# Patient Record
Sex: FEMALE | Race: WHITE | NOT HISPANIC OR LATINO | Employment: UNEMPLOYED | URBAN - METROPOLITAN AREA
[De-identification: names, ages, dates, MRNs, and addresses within clinical notes are randomized per-mention and may not be internally consistent; named-entity substitution may affect disease eponyms.]

---

## 2022-10-04 ENCOUNTER — OFFICE VISIT (OUTPATIENT)
Dept: OBGYN CLINIC | Facility: CLINIC | Age: 47
End: 2022-10-04

## 2022-10-04 ENCOUNTER — TELEPHONE (OUTPATIENT)
Dept: OBGYN CLINIC | Facility: CLINIC | Age: 47
End: 2022-10-04

## 2022-10-04 VITALS
WEIGHT: 131 LBS | BODY MASS INDEX: 21.05 KG/M2 | HEIGHT: 66 IN | SYSTOLIC BLOOD PRESSURE: 108 MMHG | DIASTOLIC BLOOD PRESSURE: 68 MMHG

## 2022-10-04 DIAGNOSIS — N93.8 DUB (DYSFUNCTIONAL UTERINE BLEEDING): Primary | ICD-10-CM

## 2022-10-04 PROCEDURE — 88305 TISSUE EXAM BY PATHOLOGIST: CPT | Performed by: PATHOLOGY

## 2022-10-04 NOTE — PROGRESS NOTES
Assessment/Plan:     There are no diagnoses linked to this encounter  49-year-old female   Dysfunctional uterine bleeding  Prior to vaginal delivery   Family history of breast cancer  Had BTL contraception  Plan   Endometrial biopsy today   Pelvic ultrasound   Return to office for annual exam and Pap smear  Will consider referral to genetic on oncology consult at the time of the annual exam working on her insurance   Will discuss management option for her bleeding after biopsy results and ultrasound plan explained and discussed with patient all patient questions answered and patient was satisfied      Subjective:      Patient ID: Shemar Ellsworth is a 50 y o  female      HPI  49 yo female    2     (13x regx4)  Here today sec new onset of heavy bleeding and prolong  Lasted 17 days this month  Bleeding is heavy passing clots  Was better when she was in [facility] bleeding become irreg and very heavy patient under lots of stress recently    PMH NONE  medcs NONE  psh btl,tonsillectomy, wisdom tooth  FH mother breast cancer, father lung cancer       2 maternal aunt breast cancer      Different etiology of her irregular and heavy bleeding explained and discussed with patient in details I would recommend to proceed with endometrial biopsy evaluation of the endometrium cavity followed by pelvic ultrasound check for any fibroid or other abnormality then patient need to return to office for annual exam for Pap smear breast exam specially with her family history of breast cancer, genetic testing also is recommended, then will discuss management option for her bleeding plan explained and discussed with patient all patient questions answered and patient was satisfied    The following portions of the patient's history were reviewed and updated as appropriate: allergies, current medications, past family history, past medical history, past social history, past surgical history and problem list     Review of Systems      Objective:      /68 (BP Location: Left arm, Patient Position: Sitting, Cuff Size: Adult)   Ht 5' 6" (1 676 m)   Wt 59 4 kg (131 lb)   BMI 21 14 kg/m²          Physical Exam      Endometrial biopsy    Date/Time: 10/5/2022 11:30 AM  Performed by: Josse Leon MD  Authorized by: Josse Leon MD   Universal Protocol:  Consent: Verbal consent obtained  Risks and benefits: risks, benefits and alternatives were discussed  Consent given by: patient  Time out: Immediately prior to procedure a "time out" was called to verify the correct patient, procedure, equipment, support staff and site/side marked as required    Patient understanding: patient states understanding of the procedure being performed  Patient consent: the patient's understanding of the procedure matches consent given  Procedure consent: procedure consent matches procedure scheduled  Relevant documents: relevant documents present and verified  Patient identity confirmed: verbally with patient      Procedure:     Procedure: endometrial biopsy with Pipelle      A bivalve speculum was placed in the vagina: yes      Cervix cleaned and prepped: yes      The cervix was dilated: no      Uterus sounded: no      Specimen collected: specimen collected and sent to pathology      Patient tolerated procedure well with no complications: yes    Findings:     Uterus size:  Non-gravid    Cervix: normal      Adnexa: normal

## 2022-10-04 NOTE — TELEPHONE ENCOUNTER
----- Message from Ignacia Layne sent at 10/4/2022  2:31 PM EDT -----  Please call patient and add her to the schedule with one of the PA since they will have less wait time     ----- Message -----  From: Wilfredo Preston LPN  Sent: 19/1/8510   1:31 PM EDT  To: Don Irizarry    Patient needs a yearly appointment prior to January , "states she cannot wait"

## 2022-10-05 PROCEDURE — 58100 BIOPSY OF UTERUS LINING: CPT | Performed by: OBSTETRICS & GYNECOLOGY

## 2022-11-21 ENCOUNTER — ANNUAL EXAM (OUTPATIENT)
Dept: OBGYN CLINIC | Facility: CLINIC | Age: 47
End: 2022-11-21

## 2022-11-21 VITALS
WEIGHT: 133.4 LBS | HEIGHT: 66 IN | DIASTOLIC BLOOD PRESSURE: 62 MMHG | SYSTOLIC BLOOD PRESSURE: 114 MMHG | BODY MASS INDEX: 21.44 KG/M2

## 2022-11-21 DIAGNOSIS — N94.6 DYSMENORRHEA: ICD-10-CM

## 2022-11-21 DIAGNOSIS — Z12.31 SCREENING MAMMOGRAM, ENCOUNTER FOR: ICD-10-CM

## 2022-11-21 DIAGNOSIS — Z01.411 ENCOUNTER FOR GYNECOLOGICAL EXAMINATION WITH ABNORMAL FINDING: Primary | ICD-10-CM

## 2022-11-21 RX ORDER — CYCLOBENZAPRINE HCL 10 MG
10 TABLET ORAL
Qty: 20 TABLET | Refills: 1 | Status: SHIPPED | OUTPATIENT
Start: 2022-11-21

## 2022-11-21 NOTE — PROGRESS NOTES
Subjective      Carina Bailey is a 50 y o  female who presents for annual well woman exam  Periods are irregular, lasting 1-9 days  No intermenstrual bleeding, spotting, or discharge  Period worse in [place] better in [place]     Patient bleeding and pain affecting her quality of life need to stop her activity a cancel all her appointment when she has her menses secondary to the heavy bleeding and secondary to the pain patient desired definitive management as her bleeding and pain affecting her quality of life patient has endometrial biopsy results review and discussed with patient management option explained and discussed with patient patient desired definitive management and desire to proceed with    Current contraception: tubal ligation  History of abnormal Pap smear: no  Family history of uterine or ovarian cancer: no    Family history of breast cancer: yes -     Menstrual History:  OB History        3    Para   2    Term   2            AB   1    Living   2       SAB        IAB   1    Ectopic        Multiple        Live Births                    Patient's last menstrual period was 2022 (exact date)  Period Cycle (Days): 28  Period Pattern: (!) Irregular  Menstrual Flow: Heavy  Dysmenorrhea: (!) Severe  Dysmenorrhea Symptoms: Cramping    The following portions of the patient's history were reviewed and updated as appropriate: allergies, current medications, past family history, past medical history, past social history, past surgical history and problem list     Review of Systems  Review of Systems   Constitutional: Negative for activity change, appetite change, chills, fatigue and fever  Respiratory: Negative for apnea, cough, chest tightness and shortness of breath  Cardiovascular: Negative for chest pain, palpitations and leg swelling  Gastrointestinal: Negative for abdominal pain, constipation, diarrhea, nausea and vomiting     Genitourinary: Negative for difficulty urinating, dysuria, flank pain, frequency, hematuria and urgency  Neurological: Negative for dizziness, seizures, syncope, light-headedness, numbness and headaches  Psychiatric/Behavioral: Negative for agitation and confusion  Objective      /62 (BP Location: Left arm, Patient Position: Sitting, Cuff Size: Adult)   Ht 5' 6" (1 676 m)   Wt 60 5 kg (133 lb 6 4 oz)   LMP 11/16/2022 (Exact Date)   BMI 21 53 kg/m²       Final Diagnosis   A  Endometrium (biopsy):     - Proliferative endometrium with ciliated (tubal) metaplasia and suggestion of polyp formation        - Negative for atypia and malignancy         Physical Exam  OBGyn Exam     General:   alert and oriented, in no acute distress, alert, appears stated age and cooperative   Heart: regular rate and rhythm, S1, S2 normal, no murmur, click, rub or gallop   Lungs: clear to auscultation bilaterally   Abdomen: soft, non-tender, without masses or organomegaly   Vulva: normal   Vagina: normal mucosa, normal discharge   Cervix: no cervical motion tenderness and no lesions   Uterus: normal size   Adnexa:  Breast Exam:  normal adnexa  breasts appear normal, no suspicious masses, no skin or nipple changes or axillary nodes  Assessment      @well woman@      75-year-old female   Annual exam  Dysfunctional uterine bleeding benign endometrial biopsy suggest endometrial polyp  Prior 2 vaginal delivery   Family history of breast cancer  Had BTL contraception   Menorrhagia/dysmenorrhea affecting quality of life desired definitive management  Plan   Pap/HPV   Diet/exercise   Calcium/vitamin-D  Pelvic ultrasound   Return to office for preop consult desire to proceed with definitive management for her bleeding and pain that affecting her quality of life and stopping performed managing her daily activity   Pain is not responding to NSAIDs as well as bleeding not responding to NSAID patient try Effexor in the past prescription given   Will proceed with V notes laparoscopic hysterectomy after Pap smear results and ultrasound     All questions answered  There are no Patient Instructions on file for this visit

## 2022-11-22 DIAGNOSIS — Z01.818 PRE-OP TESTING: Primary | ICD-10-CM

## 2022-11-22 LAB
HPV HR 12 DNA CVX QL NAA+PROBE: NEGATIVE
HPV16 DNA CVX QL NAA+PROBE: NEGATIVE
HPV18 DNA CVX QL NAA+PROBE: NEGATIVE

## 2022-11-28 LAB
LAB AP GYN PRIMARY INTERPRETATION: NORMAL
Lab: NORMAL
PATH INTERP SPEC-IMP: NORMAL